# Patient Record
Sex: MALE | Race: ASIAN
[De-identification: names, ages, dates, MRNs, and addresses within clinical notes are randomized per-mention and may not be internally consistent; named-entity substitution may affect disease eponyms.]

---

## 2020-11-04 ENCOUNTER — HOSPITAL ENCOUNTER (INPATIENT)
Dept: HOSPITAL 15 - ER | Age: 80
LOS: 4 days | Discharge: HOME HEALTH | DRG: 377 | End: 2020-11-08
Attending: INTERNAL MEDICINE | Admitting: NURSE PRACTITIONER
Payer: COMMERCIAL

## 2020-11-04 VITALS — SYSTOLIC BLOOD PRESSURE: 139 MMHG | DIASTOLIC BLOOD PRESSURE: 75 MMHG

## 2020-11-04 VITALS — DIASTOLIC BLOOD PRESSURE: 89 MMHG | SYSTOLIC BLOOD PRESSURE: 155 MMHG

## 2020-11-04 VITALS — DIASTOLIC BLOOD PRESSURE: 66 MMHG | SYSTOLIC BLOOD PRESSURE: 113 MMHG

## 2020-11-04 VITALS — SYSTOLIC BLOOD PRESSURE: 132 MMHG | DIASTOLIC BLOOD PRESSURE: 74 MMHG

## 2020-11-04 VITALS — SYSTOLIC BLOOD PRESSURE: 144 MMHG | DIASTOLIC BLOOD PRESSURE: 84 MMHG

## 2020-11-04 VITALS — DIASTOLIC BLOOD PRESSURE: 81 MMHG | SYSTOLIC BLOOD PRESSURE: 159 MMHG

## 2020-11-04 VITALS — DIASTOLIC BLOOD PRESSURE: 91 MMHG | SYSTOLIC BLOOD PRESSURE: 120 MMHG

## 2020-11-04 VITALS — HEIGHT: 66 IN | WEIGHT: 134.04 LBS | BODY MASS INDEX: 21.54 KG/M2

## 2020-11-04 VITALS — SYSTOLIC BLOOD PRESSURE: 113 MMHG | DIASTOLIC BLOOD PRESSURE: 66 MMHG

## 2020-11-04 VITALS — DIASTOLIC BLOOD PRESSURE: 80 MMHG | SYSTOLIC BLOOD PRESSURE: 136 MMHG

## 2020-11-04 VITALS — DIASTOLIC BLOOD PRESSURE: 61 MMHG | SYSTOLIC BLOOD PRESSURE: 138 MMHG

## 2020-11-04 VITALS — DIASTOLIC BLOOD PRESSURE: 89 MMHG | SYSTOLIC BLOOD PRESSURE: 166 MMHG

## 2020-11-04 VITALS — SYSTOLIC BLOOD PRESSURE: 138 MMHG | DIASTOLIC BLOOD PRESSURE: 61 MMHG

## 2020-11-04 DIAGNOSIS — T45.515A: ICD-10-CM

## 2020-11-04 DIAGNOSIS — I12.9: ICD-10-CM

## 2020-11-04 DIAGNOSIS — Z95.2: ICD-10-CM

## 2020-11-04 DIAGNOSIS — B96.4: ICD-10-CM
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
## 2020-11-04 DIAGNOSIS — D50.0: ICD-10-CM

## 2020-11-04 DIAGNOSIS — D75.89: ICD-10-CM

## 2020-11-04 DIAGNOSIS — N17.0: ICD-10-CM

## 2020-11-04 DIAGNOSIS — E44.0: ICD-10-CM

## 2020-11-04 DIAGNOSIS — R62.7: ICD-10-CM

## 2020-11-04 DIAGNOSIS — Z79.899: ICD-10-CM

## 2020-11-04 DIAGNOSIS — K92.2: Primary | ICD-10-CM

## 2020-11-04 DIAGNOSIS — I25.10: ICD-10-CM

## 2020-11-04 DIAGNOSIS — N39.0: ICD-10-CM

## 2020-11-04 DIAGNOSIS — N18.4: ICD-10-CM

## 2020-11-04 DIAGNOSIS — Z79.01: ICD-10-CM

## 2020-11-04 DIAGNOSIS — Z88.0: ICD-10-CM

## 2020-11-04 DIAGNOSIS — Z95.810: ICD-10-CM

## 2020-11-04 LAB
ALBUMIN SERPL-MCNC: 3.1 G/DL (ref 3.4–5)
ALP SERPL-CCNC: 44 U/L (ref 45–117)
ALT SERPL-CCNC: 21 U/L (ref 16–61)
ANION GAP SERPL CALCULATED.3IONS-SCNC: 5 MMOL/L (ref 5–15)
APTT PPP: 48.5 SEC (ref 23–31.2)
BILIRUB SERPL-MCNC: 0.5 MG/DL (ref 0.2–1)
BUN SERPL-MCNC: 80 MG/DL (ref 7–18)
BUN/CREAT SERPL: 27
CALCIUM SERPL-MCNC: 8.3 MG/DL (ref 8.5–10.1)
CHLORIDE SERPL-SCNC: 113 MMOL/L (ref 98–107)
CO2 SERPL-SCNC: 22 MMOL/L (ref 21–32)
GLUCOSE SERPL-MCNC: 112 MG/DL (ref 74–106)
HCT VFR BLD AUTO: 20.2 % (ref 41–53)
HGB BLD-MCNC: 6.7 G/DL (ref 13.5–17.5)
INR PPP: > 8 (ref 0.9–1.15)
IRON SERPL-MCNC: 37 UG/DL (ref 65–175)
MCH RBC QN AUTO: 35.7 PG (ref 28–32)
MCV RBC AUTO: 107.8 FL (ref 80–100)
NRBC BLD QL AUTO: 0.1 %
POTASSIUM SERPL-SCNC: 4.6 MMOL/L (ref 3.5–5.1)
PROT SERPL-MCNC: 6.2 G/DL (ref 6.4–8.2)
SODIUM SERPL-SCNC: 140 MMOL/L (ref 136–145)
TIBC SERPL-MCNC: 291 UG/DL (ref 250–450)

## 2020-11-04 PROCEDURE — 80053 COMPREHEN METABOLIC PANEL: CPT

## 2020-11-04 PROCEDURE — 87186 SC STD MICRODIL/AGAR DIL: CPT

## 2020-11-04 PROCEDURE — 82607 VITAMIN B-12: CPT

## 2020-11-04 PROCEDURE — 82746 ASSAY OF FOLIC ACID SERUM: CPT

## 2020-11-04 PROCEDURE — 83540 ASSAY OF IRON: CPT

## 2020-11-04 PROCEDURE — 76775 US EXAM ABDO BACK WALL LIM: CPT

## 2020-11-04 PROCEDURE — 87088 URINE BACTERIA CULTURE: CPT

## 2020-11-04 PROCEDURE — 83735 ASSAY OF MAGNESIUM: CPT

## 2020-11-04 PROCEDURE — 97110 THERAPEUTIC EXERCISES: CPT

## 2020-11-04 PROCEDURE — 86901 BLOOD TYPING SEROLOGIC RH(D): CPT

## 2020-11-04 PROCEDURE — 82570 ASSAY OF URINE CREATININE: CPT

## 2020-11-04 PROCEDURE — 71045 X-RAY EXAM CHEST 1 VIEW: CPT

## 2020-11-04 PROCEDURE — 86920 COMPATIBILITY TEST SPIN: CPT

## 2020-11-04 PROCEDURE — 30233K1 TRANSFUSION OF NONAUTOLOGOUS FROZEN PLASMA INTO PERIPHERAL VEIN, PERCUTANEOUS APPROACH: ICD-10-PCS | Performed by: INTERNAL MEDICINE

## 2020-11-04 PROCEDURE — 93005 ELECTROCARDIOGRAM TRACING: CPT

## 2020-11-04 PROCEDURE — 84300 ASSAY OF URINE SODIUM: CPT

## 2020-11-04 PROCEDURE — 86900 BLOOD TYPING SEROLOGIC ABO: CPT

## 2020-11-04 PROCEDURE — 84443 ASSAY THYROID STIM HORMONE: CPT

## 2020-11-04 PROCEDURE — 97116 GAIT TRAINING THERAPY: CPT

## 2020-11-04 PROCEDURE — 86850 RBC ANTIBODY SCREEN: CPT

## 2020-11-04 PROCEDURE — 30230N1 TRANSFUSION OF NONAUTOLOGOUS RED BLOOD CELLS INTO PERIPHERAL VEIN, OPEN APPROACH: ICD-10-PCS | Performed by: INTERNAL MEDICINE

## 2020-11-04 PROCEDURE — 83550 IRON BINDING TEST: CPT

## 2020-11-04 PROCEDURE — 84484 ASSAY OF TROPONIN QUANT: CPT

## 2020-11-04 PROCEDURE — 85610 PROTHROMBIN TIME: CPT

## 2020-11-04 PROCEDURE — 36415 COLL VENOUS BLD VENIPUNCTURE: CPT

## 2020-11-04 PROCEDURE — 80048 BASIC METABOLIC PNL TOTAL CA: CPT

## 2020-11-04 PROCEDURE — 81001 URINALYSIS AUTO W/SCOPE: CPT

## 2020-11-04 PROCEDURE — 85730 THROMBOPLASTIN TIME PARTIAL: CPT

## 2020-11-04 PROCEDURE — 85025 COMPLETE CBC W/AUTO DIFF WBC: CPT

## 2020-11-04 PROCEDURE — 87086 URINE CULTURE/COLONY COUNT: CPT

## 2020-11-04 PROCEDURE — 97530 THERAPEUTIC ACTIVITIES: CPT

## 2020-11-04 PROCEDURE — 84100 ASSAY OF PHOSPHORUS: CPT

## 2020-11-04 RX ADMIN — SODIUM CHLORIDE SCH MLS/HR: 0.9 INJECTION, SOLUTION INTRAVENOUS at 10:15

## 2020-11-04 RX ADMIN — METOPROLOL TARTRATE SCH MG: 25 TABLET, FILM COATED ORAL at 22:21

## 2020-11-04 RX ADMIN — PANTOPRAZOLE SODIUM SCH MG: 40 INJECTION, POWDER, FOR SOLUTION INTRAVENOUS at 22:20

## 2020-11-04 RX ADMIN — SODIUM CHLORIDE SCH MLS/HR: 0.9 INJECTION, SOLUTION INTRAVENOUS at 20:15

## 2020-11-04 RX ADMIN — SODIUM CHLORIDE SCH MLS/HR: 0.9 INJECTION, SOLUTION INTRAVENOUS at 23:23

## 2020-11-04 NOTE — NUR
Went to Laboratory to  [facility] with Corrected name on consent. Please see hard chart. CN 
aware. will endorse with AM nurse to let MD sign blood consent with corrected name. Lab 
verified.

## 2020-11-04 NOTE — NUR
assessment

Patient is a 80 year old male. Per patients wife Jeanette prior to admission patient lived home 
with her and needed assistance. Per Jeanette patient has a fww, and a wheelchair for home use. 
Patients PCP is Dr Ganesh Moseley in [place]. Jeanette informed me patient has become more weak lately 
and needs SNF for rehab. I informed Jeanette if patient qualifies for SNF it would be in the [place] 
area. Jeanette verbalized understanding. I informed Jeanette that patient will need a PT eval prior 
to requesting auth. I informed Jeanette patients post discharge needs to be determined after MD 
work up. I informed Jeanette I will continue to monitor and follow up as appropriate for any 
post discharge needs. Jeanette verbalized understanding. 

-------------------------------------------------------------------------------

Addendum: 11/04/20 at 1510 by Angella COLVIN

-------------------------------------------------------------------------------

Amended: Links added.

## 2020-11-04 NOTE — NUR
BLOOD TRANSFUSION INITIATED

PRE SET OF VS TAKEN

EDUCATION REGARDING BLOOD TRANSFUSION REACTION GIVEN TO PT, VERBALIS UNDERSTANDING

## 2020-11-04 NOTE — NUR
ONGOING [facility] TRANSFUSION. PATIENT NO COMPLAINS, STABLE VITAL SIGNS. NO SOB AND NO TRANSFUSION 
REACTION SEEN.

## 2020-11-04 NOTE — NUR
Vitals signs of patient is within normal limits. patient stable. no sob and distress seen 
before transfusion. will continue to monitor.

## 2020-11-04 NOTE — NUR
YULISA HENSON

IS HERE TO SIGN THE BLOOD TRANSFUSION CONSENT, ADVICE TO TRANSFUSE THE BLOOD FIRST THEN 
TRANSFUSE TH EPLASMA

## 2020-11-05 VITALS — DIASTOLIC BLOOD PRESSURE: 93 MMHG | SYSTOLIC BLOOD PRESSURE: 148 MMHG

## 2020-11-05 VITALS — SYSTOLIC BLOOD PRESSURE: 128 MMHG | DIASTOLIC BLOOD PRESSURE: 71 MMHG

## 2020-11-05 VITALS — DIASTOLIC BLOOD PRESSURE: 90 MMHG | SYSTOLIC BLOOD PRESSURE: 142 MMHG

## 2020-11-05 VITALS — DIASTOLIC BLOOD PRESSURE: 60 MMHG | SYSTOLIC BLOOD PRESSURE: 100 MMHG

## 2020-11-05 VITALS — SYSTOLIC BLOOD PRESSURE: 94 MMHG | DIASTOLIC BLOOD PRESSURE: 64 MMHG

## 2020-11-05 LAB
ANION GAP SERPL CALCULATED.3IONS-SCNC: 2 MMOL/L (ref 5–15)
BUN SERPL-MCNC: 57 MG/DL (ref 7–18)
BUN/CREAT SERPL: 25.3
CALCIUM SERPL-MCNC: 7.7 MG/DL (ref 8.5–10.1)
CHLORIDE SERPL-SCNC: 118 MMOL/L (ref 98–107)
CO2 SERPL-SCNC: 24 MMOL/L (ref 21–32)
GLUCOSE SERPL-MCNC: 89 MG/DL (ref 74–106)
HCT VFR BLD AUTO: 25.5 % (ref 41–53)
HGB BLD-MCNC: 8.6 G/DL (ref 13.5–17.5)
INR PPP: 2.82 (ref 0.9–1.15)
MCH RBC QN AUTO: 32.8 PG (ref 28–32)
MCV RBC AUTO: 97.3 FL (ref 80–100)
NRBC BLD QL AUTO: 0.1 %
POTASSIUM SERPL-SCNC: 4.5 MMOL/L (ref 3.5–5.1)
SODIUM SERPL-SCNC: 144 MMOL/L (ref 136–145)

## 2020-11-05 RX ADMIN — METOPROLOL TARTRATE SCH MG: 25 TABLET, FILM COATED ORAL at 09:33

## 2020-11-05 RX ADMIN — PANTOPRAZOLE SODIUM SCH MG: 40 INJECTION, POWDER, FOR SOLUTION INTRAVENOUS at 09:32

## 2020-11-05 RX ADMIN — METOPROLOL TARTRATE SCH MG: 25 TABLET, FILM COATED ORAL at 22:00

## 2020-11-05 RX ADMIN — PANTOPRAZOLE SODIUM SCH MG: 40 INJECTION, POWDER, FOR SOLUTION INTRAVENOUS at 22:19

## 2020-11-05 NOTE — NUR
Opening Shift Note

Assumed care of patient, awake and alert.  No S/S of distress/SOB or pain. Bed in low locked 
position, call light within reach, siderails up x2. Safety fall precaution education given. 
Patient agreed and verbalized understanding.  Instructed on POC and to call for assist PRN, 
will continue to monitor for changes Q1hr and PRN.

## 2020-11-05 NOTE — NUR
Closing shift event.



Patient alert and oriented x4. No SOB, no acute distress seen. Safety Fall precaution 
observed.

## 2020-11-05 NOTE — NUR
CALLED PHARMACY FOR FERR GLUC IVPB. NOC SHIFT RN MADE AWARE PHARMACY WITH SEND FERR GLUC VIA 
BULET.

## 2020-11-05 NOTE — NUR
CALLED DR. TOYA KOENIG PER DR. HENRY FOR DISCHARGE CLEARANCE. ONE MESSAGE WAS LEFT WITH 
ANSWERING SERVICES. AWAITING FOR RESPONSE.

## 2020-11-06 VITALS — DIASTOLIC BLOOD PRESSURE: 76 MMHG | SYSTOLIC BLOOD PRESSURE: 134 MMHG

## 2020-11-06 VITALS — SYSTOLIC BLOOD PRESSURE: 135 MMHG | DIASTOLIC BLOOD PRESSURE: 80 MMHG

## 2020-11-06 VITALS — DIASTOLIC BLOOD PRESSURE: 78 MMHG | SYSTOLIC BLOOD PRESSURE: 135 MMHG

## 2020-11-06 VITALS — DIASTOLIC BLOOD PRESSURE: 78 MMHG | SYSTOLIC BLOOD PRESSURE: 126 MMHG

## 2020-11-06 VITALS — SYSTOLIC BLOOD PRESSURE: 132 MMHG | DIASTOLIC BLOOD PRESSURE: 75 MMHG

## 2020-11-06 LAB
ANION GAP SERPL CALCULATED.3IONS-SCNC: 2 MMOL/L (ref 5–15)
BUN SERPL-MCNC: 44 MG/DL (ref 7–18)
BUN/CREAT SERPL: 22.1
CALCIUM SERPL-MCNC: 8 MG/DL (ref 8.5–10.1)
CHLORIDE SERPL-SCNC: 116 MMOL/L (ref 98–107)
CO2 SERPL-SCNC: 24 MMOL/L (ref 21–32)
GLUCOSE SERPL-MCNC: 85 MG/DL (ref 74–106)
HCT VFR BLD AUTO: 28.1 % (ref 41–53)
HGB BLD-MCNC: 9.4 G/DL (ref 13.5–17.5)
INR PPP: 2.01 (ref 0.9–1.15)
MAGNESIUM SERPL-MCNC: 2.6 MG/DL (ref 1.6–2.6)
MCH RBC QN AUTO: 32.7 PG (ref 28–32)
MCV RBC AUTO: 97.5 FL (ref 80–100)
NRBC BLD QL AUTO: 0 %
POTASSIUM SERPL-SCNC: 4.8 MMOL/L (ref 3.5–5.1)
SODIUM SERPL-SCNC: 142 MMOL/L (ref 136–145)

## 2020-11-06 RX ADMIN — PANTOPRAZOLE SODIUM SCH MG: 40 INJECTION, POWDER, FOR SOLUTION INTRAVENOUS at 13:30

## 2020-11-06 RX ADMIN — METOPROLOL TARTRATE SCH MG: 25 TABLET, FILM COATED ORAL at 13:30

## 2020-11-06 RX ADMIN — METOPROLOL TARTRATE SCH MG: 25 TABLET, FILM COATED ORAL at 23:10

## 2020-11-06 RX ADMIN — PANTOPRAZOLE SODIUM SCH MG: 40 INJECTION, POWDER, FOR SOLUTION INTRAVENOUS at 23:11

## 2020-11-06 RX ADMIN — SODIUM CHLORIDE SCH MLS/HR: 9 INJECTION, SOLUTION INTRAVENOUS at 13:58

## 2020-11-06 NOTE — NUR
Closing shift event.



Patient alert and oriented. No SOB, no acute distress seen. Safety Fall precaution observed

## 2020-11-06 NOTE — NUR
IV insertion

IV access obtained, via clean sterile technique by inserting 22 gauge catheter at Left Hand 
after 1 attempt. IV secured properly. No trauma to site. Patient tolerated well.

NOTE:

## 2020-11-06 NOTE — NUR
Opening Shift Note

Assumed care of patient, awake and alert.  No S/S of distress/SOB or pain. Bed in lowest 
locked position, side rails up x2, call light within reach. Instructed on POC and to call 
for assist PRN, will continue to monitor for changes Q1hr and PRN.

## 2020-11-06 NOTE — NUR
Nutrition Assessment 



Est energy needs 4345-8293 kcal (25-30 kcal/kg BW 64.4kg) Est protein needs 52-64g 
(0.8-1g/kg BW 64.4kg) Will monitor and reassess prn. 

-------------------------------------------------------------------------------

Addendum: 11/06/20 at 1145 by MAVERICK YOUSSEF RD

-------------------------------------------------------------------------------

Amended: Links added.

## 2020-11-07 VITALS — SYSTOLIC BLOOD PRESSURE: 128 MMHG | DIASTOLIC BLOOD PRESSURE: 81 MMHG

## 2020-11-07 VITALS — DIASTOLIC BLOOD PRESSURE: 66 MMHG | SYSTOLIC BLOOD PRESSURE: 114 MMHG

## 2020-11-07 VITALS — DIASTOLIC BLOOD PRESSURE: 78 MMHG | SYSTOLIC BLOOD PRESSURE: 136 MMHG

## 2020-11-07 VITALS — SYSTOLIC BLOOD PRESSURE: 133 MMHG | DIASTOLIC BLOOD PRESSURE: 76 MMHG

## 2020-11-07 LAB
ANION GAP SERPL CALCULATED.3IONS-SCNC: 4 MMOL/L (ref 5–15)
BUN SERPL-MCNC: 32 MG/DL (ref 7–18)
BUN/CREAT SERPL: 17.9
CALCIUM SERPL-MCNC: 8.2 MG/DL (ref 8.5–10.1)
CHLORIDE SERPL-SCNC: 113 MMOL/L (ref 98–107)
CO2 SERPL-SCNC: 24 MMOL/L (ref 21–32)
GLUCOSE SERPL-MCNC: 74 MG/DL (ref 74–106)
HCT VFR BLD AUTO: 27.2 % (ref 41–53)
HGB BLD-MCNC: 9.2 G/DL (ref 13.5–17.5)
INR PPP: 1.89 (ref 0.9–1.15)
MCH RBC QN AUTO: 32.8 PG (ref 28–32)
MCV RBC AUTO: 97.2 FL (ref 80–100)
NRBC BLD QL AUTO: 0.1 %
POTASSIUM SERPL-SCNC: 5 MMOL/L (ref 3.5–5.1)
SODIUM SERPL-SCNC: 141 MMOL/L (ref 136–145)

## 2020-11-07 RX ADMIN — METOPROLOL TARTRATE SCH MG: 25 TABLET, FILM COATED ORAL at 22:00

## 2020-11-07 RX ADMIN — PANTOPRAZOLE SODIUM SCH MG: 40 INJECTION, POWDER, FOR SOLUTION INTRAVENOUS at 12:19

## 2020-11-07 RX ADMIN — SODIUM CHLORIDE SCH MLS/HR: 9 INJECTION, SOLUTION INTRAVENOUS at 12:19

## 2020-11-07 RX ADMIN — METOPROLOL TARTRATE SCH MG: 25 TABLET, FILM COATED ORAL at 12:18

## 2020-11-07 RX ADMIN — PANTOPRAZOLE SODIUM SCH MG: 40 INJECTION, POWDER, FOR SOLUTION INTRAVENOUS at 22:00

## 2020-11-07 NOTE — NUR
Received phone call from Dr. PRITESH Bill, possible EGD tomorrow, depending on schedule. MD 
will call in AM to see if able to schedule procedure then inform dayshift RN. New order 
received to keep patient NPO at midnight. Orders read back and verified, will implement as 
ordered and continue to monitor.

## 2020-11-07 NOTE — NUR
PT REPORTS NO C/O PAIN AND OR DISCOMFORT. PT STATES HE HAS C/O DIZZINESS. PT WORKED ON 
BEDSIDE THER EX IN CHAIR PRIOR TO GAIT. PT WAS ABLE TO COMPLETE EXERCISES FOR BILAT UE AND 
LE'S X 10 REPS. PT WORKED ON T Y AND I EXERCISES FOR UE AND KNEE EXT, MARCHING, HEEL AND TOE 
RAISES FOR BILAT LE. PT AMBULATED X 130FT AROUND PERIMETER OF NURSES STATION AND ONCE HE WAS 
COMPLETED PT WAS XFRD BACK TO BED AND CONTINUED TO HAVE C/O DIZZINESS. PT WAS LEFT SUPINE IN 
BED WITH CALL LIGHT WITHIN REACH.

-------------------------------------------------------------------------------

Addendum: 11/07/20 at 1101 by Essence England PT

-------------------------------------------------------------------------------

Amended: Links added.

## 2020-11-08 VITALS — SYSTOLIC BLOOD PRESSURE: 125 MMHG | DIASTOLIC BLOOD PRESSURE: 67 MMHG

## 2020-11-08 VITALS — DIASTOLIC BLOOD PRESSURE: 67 MMHG | SYSTOLIC BLOOD PRESSURE: 125 MMHG

## 2020-11-08 VITALS — SYSTOLIC BLOOD PRESSURE: 116 MMHG | DIASTOLIC BLOOD PRESSURE: 70 MMHG

## 2020-11-08 VITALS — DIASTOLIC BLOOD PRESSURE: 62 MMHG | SYSTOLIC BLOOD PRESSURE: 91 MMHG

## 2020-11-08 LAB
HCT VFR BLD AUTO: 28.4 % (ref 41–53)
HGB BLD-MCNC: 9.2 G/DL (ref 13.5–17.5)
INR PPP: 2.39 (ref 0.9–1.15)
MCH RBC QN AUTO: 31.8 PG (ref 28–32)
MCV RBC AUTO: 97.7 FL (ref 80–100)
NRBC BLD QL AUTO: 0.1 %

## 2020-11-08 RX ADMIN — SODIUM CHLORIDE SCH MLS/HR: 9 INJECTION, SOLUTION INTRAVENOUS at 13:15

## 2020-11-08 RX ADMIN — METOPROLOL TARTRATE SCH MG: 25 TABLET, FILM COATED ORAL at 10:12

## 2020-11-08 RX ADMIN — PANTOPRAZOLE SODIUM SCH MG: 40 INJECTION, POWDER, FOR SOLUTION INTRAVENOUS at 10:12

## 2020-11-08 NOTE — NUR
Nutrition Followup Note 



Wt 60.8kg



Pt was alert and oriented at rounds.  Pt reports appetite is good and denies GI issues.  Pt 
was NPO possible EGD per RN note. Pt diet adv to [facility] soft while writing this note.  Pt 
appetite is good aeb pt with 81% avg po intake x 2 days per RN note 



Est energy needs 8166-3988 kcal (25-30 kcal/kg BW 64.4kg) Est protein needs 52-64g 
(0.8-1g/kg BW 64.4kg) Will monitor and reassess prn. 



Labs:  BUN 32H, Creat 1.79H, Alb 3.1L, Ca 8.2L 



BM:  Pt with no BMs since admission per pt 



Skin:  BS 20 low risk, full details in RN note.  



PES:  Resolved prior to NPO status, will monitor for intake after procedure:  Inadequate 
oral intake r/t current medical condition aeb pt with a clear liquid diet 



Comments 1) Continue to monitor diet status, labs, skin, weight 2) refer pt to [facility] on DC 3) 
Continue current plan of care



Expected Outcomes/Goals: 1) pt to consume >75% of po intake 2) pt to maintain wt while in 
hospital 3) f/u 3-5 days

## 2020-11-08 NOTE — NUR
1345 11/8/20 - Faxed to [facility] at [phone], face sheet, Order for home 
health for safety, PT, medication management, vital. Pending review, and acceptance.

## 2020-11-08 NOTE — NUR
Discharge instructions given as ordered. Encourage to follow up with PMD as instructed. All 
questions and concerns addressed. Patient verbalized understanding.  Medication 
reconciliation form completed and copy given to patient. IV removed with catheter intact, 
and pressure dressing applied.  Telemetry unit returned to ICU. Patient taken to vehicle via 
wheelchair with all personal belongings, accompanied by staff member. No distress noted at 
time of departure.

## 2020-11-08 NOTE — NUR
Attempted to collect COVID swab per hospital protocol for potential pending EGD. Patient 
refusing at this time. Patient educated that swab is done per protocol for possible 
procedure and that he may not be able to go down for the EGD without a COVID swab done, 
patient continuing to refuse at this time. No s/s of distress. Will continue to monitor.

## 2020-11-08 NOTE — NUR
1415 11/08/20 - contacted by [facility] on-call coordinator Rosa, who stated 
someone from the office will f/u on Monday morning.

## 2020-11-08 NOTE — NUR
Patient lying in bed, eyes closed, respirations even and unlabored, appears asleep. Patient 
awakens to name and touch. No s/s of distress. Will endorse care to dayshift RN.

## 2020-11-09 NOTE — NUR
Spoke with SUSANA at [facility], [facility]antal [phone] she provided a list of contracted 
home health agencies.  Per Chantal the [facility] is responsible for providing authorization for HH 
services but the claims are paid for by [facility].  



Contacted [facility] agency [phone] spoke with Zakiya, she advised they can see the 
patient, faxed packet to [phone].

-------------------------------------------------------------------------------

Addendum: 11/09/20 at 1546 by MARTY TAYLOR SS

-------------------------------------------------------------------------------

Correction

 [facility]antal # [phone]

## 2020-11-09 NOTE — NUR
Called [facility] spoke with Zakiya, confirmed they are accepting pt onto service for , 
they need the auth faxed to# [phone], Called  [facility] [phone] left 
detailed vm to fax auth to  agency.  Requested a call back once this is completed.

## 2020-11-10 NOTE — NUR
Called [facility] advised auth was faxed to their office, provided auth# [id number], once 
hardcopy auth is rec'd per Zakiya they will call patient and schedule them.

## 2020-11-10 NOTE — NUR
Called SUSANA Atwood at [phone] she advises that  auth# [id number] has been faxed to [facility].

## 2020-11-10 NOTE — NUR
Called SUSANA Cornejo [phone] - pduk detailed VM that [facility] will see patient, auth 
needs to be faxed to [phone], also requested a call back once this is done.  Awaiting 
call back.